# Patient Record
Sex: FEMALE | Race: ASIAN | NOT HISPANIC OR LATINO | Employment: PART TIME | URBAN - METROPOLITAN AREA
[De-identification: names, ages, dates, MRNs, and addresses within clinical notes are randomized per-mention and may not be internally consistent; named-entity substitution may affect disease eponyms.]

---
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
## 2023-05-19 ENCOUNTER — OFFICE VISIT (OUTPATIENT)
Dept: FAMILY MEDICINE | Facility: CLINIC | Age: 18
End: 2023-05-19
Payer: COMMERCIAL

## 2023-05-19 VITALS
DIASTOLIC BLOOD PRESSURE: 71 MMHG | BODY MASS INDEX: 42.41 KG/M2 | WEIGHT: 210 LBS | RESPIRATION RATE: 16 BRPM | SYSTOLIC BLOOD PRESSURE: 104 MMHG | HEART RATE: 68 BPM | TEMPERATURE: 97.8 F | OXYGEN SATURATION: 99 %

## 2023-05-19 DIAGNOSIS — J02.9 SORE THROAT: Primary | ICD-10-CM

## 2023-05-19 LAB
DEPRECATED S PYO AG THROAT QL EIA: NEGATIVE
GROUP A STREP BY PCR: NOT DETECTED

## 2023-05-19 PROCEDURE — 99213 OFFICE O/P EST LOW 20 MIN: CPT | Performed by: PHYSICIAN ASSISTANT

## 2023-05-19 PROCEDURE — 87651 STREP A DNA AMP PROBE: CPT | Performed by: PHYSICIAN ASSISTANT

## 2023-05-19 NOTE — PROGRESS NOTES
Assessment & Plan     Sore throat  Likely viral.   Push fluids, rest and ibuprofen or tylenol for comfort.    RTC for persistent or worsening sx.   PI given and discussed.      - Streptococcus A Rapid Screen w/Reflex to PCR - Clinic Collect  - Group A Streptococcus PCR Throat Swab       Return for Follow up in 1 week if not improved.    Leandra Villegas PA-C  [facility] Love is a 17 year old female who presents to clinic today for the following health issues:  Chief Complaint   Patient presents with     Pharyngitis     X 4 days      HPI  Approximately 1 week hx of ST with associated uri sx.    Slowly getting worse.    Uri sx of rhinorrhea, congestion, cough.    No V/D.   eating and drinking well.   no fevers.  No known exposures.    No known allergies.    Treatment thus far: cough drops, tea temporarily helpful.    Has not done a covid 19 test , prefers to do at home.    Works with the community.    No environmental allergies.      Review of Systems  Constitutional, HEENT, cardiovascular, pulmonary, gi and gu systems are negative, except as otherwise noted.      Objective    /71 (BP Location: Right arm, Patient Position: Sitting, Cuff Size: Adult Large)   Pulse 68   Temp 97.8  F (36.6  C) (Tympanic)   Resp 16   Wt 95.3 kg (210 lb)   LMP 04/18/2023   SpO2 99%   BMI 42.41 kg/m    Physical Exam   Pt is in no acute distress and appears well  Ears patent B:  TM s intact, non-injected. All land marks easily visibile    Nasal mucosa is non-edematous, no discharge.    Pharynx: non erythematous, tonsils non hypertrophied, No exudate   Neck supple: no adenopathy  Lungs: CTA  Heart: RRR, no murmur, no thrills or heaves   Ext: no edema  Skin: no rashes    Results for orders placed or performed in visit on 05/19/23   Streptococcus A Rapid Screen w/Reflex to PCR - Clinic Collect     Status: Normal    Specimen: Throat; Swab   Result Value Ref Range    Group A Strep  antigen Negative Negative

## 2023-05-19 NOTE — PATIENT INSTRUCTIONS
Recommend considering a home covid 19 test.  It will not change your treatment plan given duration of your symptoms but would be helpful for limiting exposures and knowledge of the cause of your symptoms.      Continue conservative measures : warm tea, honey, losenges.    Could try claritin or zyrtec over the counter

## 2023-06-22 NOTE — PROGRESS NOTES
Subjective:      Kate Ny is a 12 y.o. female here with sore throat x 1 week. Pain with swallowing, sore throat seems to come and go, appetite decreased but drinking well, has been nauseated, no vomiting or diarrhea, no stomach ache. She has had some sneezing, runny nose and little bit of nasal congestion, no cough, cold symptoms have been waxing and waning for the past 1 week as well. No OTC taken. No fevers, patient afebrile in clinic.  No other ill contacts at home.      Objective:     Vitals:    12/18/17 1816   BP: 92/58   Pulse: 79   Resp: 16   Temp: 98.4  F (36.9  C)   SpO2: 98%       General: Alert, interactive, hoarse voice, NAD, cooperative on exam  Eyes: PERRLA, EOMI, conjunctivae clear.   Ears: Right TM; pink and translucent. Left TM; pink and translucent   Nose:  Nasal mucosa erythema and inflammation. Clear mucus .  No maxillary or frontal sinus tenderness  Mouth/Throat:  Tonsillar hypertrophy, 2+, erythematous, no exudate. Uvula midline with palatal petechiae. Posterior pharynx erythematous.  Mucus membranes pink and moist, free of lesions.  Neck: Supple, symmetrical, trachea midline, no adenopathy   Lungs: CTA bilaterally, good air movement throughout. No rales, rhonchi or wheezing  Heart:: Regular rate and rhythm, S1, S2 normal, no murmur, click, rub or gallop  ABD: Soft, flat, mild periumbilical tenderness, no guarding or rebound pain, nondistended, No HSM or masses. +BS      Results for orders placed or performed in visit on 12/18/17   Rapid Strep A Screen-Throat   Result Value Ref Range    Rapid Strep A Antigen Group A Strep detected (!) No Group A Strep detected, presumptive negative            Assessment/Plan:      1. Strep throat  - amoxicillin (AMOXIL) 875 MG tablet; Take 1 tablet (875 mg total) by mouth 2 (two) times a day for 10 days. Take with food.  Dispense: 20 tablet; Refill: 0    2. Throat pain  - Rapid Strep A Screen-Throat     I reviewed exam and lab findings with patient and  mom. Discussed antibiotics for strep throat with parent. Advised ibuprofen or acetaminophen prn for discomfort/fever, proper dosing discussed.  Contagious precautions reviewed. Recommended plenty of fluids and rest. Follow-up with primary for persistence or worsening of symptoms. Mom verbalized understanding and agrees with plan of care.     -Patient instructions given  -Note for school provided                    Sun Osuna(Attending)

## 2024-08-17 ENCOUNTER — OFFICE VISIT (OUTPATIENT)
Dept: FAMILY MEDICINE | Facility: CLINIC | Age: 19
End: 2024-08-17
Payer: COMMERCIAL

## 2024-08-17 VITALS
TEMPERATURE: 98.2 F | RESPIRATION RATE: 12 BRPM | DIASTOLIC BLOOD PRESSURE: 69 MMHG | HEART RATE: 88 BPM | SYSTOLIC BLOOD PRESSURE: 103 MMHG | OXYGEN SATURATION: 95 %

## 2024-08-17 DIAGNOSIS — S05.01XA ABRASION OF RIGHT CORNEA, INITIAL ENCOUNTER: Primary | ICD-10-CM

## 2024-08-17 PROCEDURE — 99213 OFFICE O/P EST LOW 20 MIN: CPT

## 2024-08-17 RX ORDER — ERYTHROMYCIN 5 MG/G
OINTMENT OPHTHALMIC
Qty: 3.5 G | Refills: 0 | Status: SHIPPED | OUTPATIENT
Start: 2024-08-17 | End: 2024-08-17

## 2024-08-17 RX ORDER — ERYTHROMYCIN 5 MG/G
OINTMENT OPHTHALMIC
Qty: 3.5 G | Refills: 0 | Status: SHIPPED | OUTPATIENT
Start: 2024-08-17

## 2024-08-17 NOTE — PROGRESS NOTES
Assessment & Plan       ICD-10-CM    1. Abrasion of right cornea, initial encounter  S05.01XA erythromycin (ROMYCIN) 5 MG/GM ophthalmic ointment     DISCONTINUED: erythromycin (ROMYCIN) 5 MG/GM ophthalmic ointment         Corneal abrasion confirmed with fluoroscein/woods lamp exam. Medial iris but on the edge nearest the pupil. Doesn't appear penetrating or large. No vision changes or anything to suggest penetrating injury or retained material. Will treat with erythromycin.     Follow up with primary care provider with any problems, questions or concerns or if symptoms worsen or fail to improve. Patient agreed to plan and verbalized understanding.     Subjective     Kate is a 19 year old female who presents to clinic today for the following health issues:  Chief Complaint   Patient presents with    Eye Problem     Hit I right eye. Incident happened around 2:00 pm today.    Conjunctivitis     HPI    Was at work today and at 2 pm a clothes  snapped right in her hands and a small piece flew and hit her eye. Still hurts but no vision changes, no bleeding, minimal lacrimation.     Review of Systems    10 point ROS performed and negative except as noted in HPI.     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    Social History     Tobacco Use    Smoking status: Never    Smokeless tobacco: Never   Substance Use Topics    Alcohol use: No           Objective    /69   Pulse 88   Temp 98.2  F (36.8  C) (Tympanic)   Resp 12   SpO2 95%   Physical Exam   Constitutional:       General: Patient is not in acute distress.     Appearance: Normal appearance.   HENT:      Head: Normocephalic and atraumatic.      Right Ear: External ear normal.      Left Ear: External ear normal.      Nose: No congestion, rhinorrhea.      Mouth/Throat:      Mouth: Mucous membranes are moist.      Pharynx:  Oropharynx is clear, No exudate.   Eyes:      General: No scleral icterus.     Extraocular Movements: Extraocular movements intact.      Conjunctiva/sclera: Conjunctivae normal.      Pupils: Pupils are equal, round, and reactive to light.   R eye with small punctate abrasion over medial iris just at the edge of the pupil (examined under woods lamp w/ fluoroscein)  Pulmonary:      Effort: Pulmonary effort is normal.   Cardiovascular:      Regular heart rate  Abdominal:      General: Abdomen is flat.   Musculoskeletal:         General: No swelling or deformity. Normal range of motion.      Cervical back: Normal range of motion and neck supple.   Skin:     General: Skin is warm and dry.      Coloration: Skin is not jaundiced.      Findings: No bruising, lesion or rash.   Neurological:      General: No focal deficit present.      Mental Status: Patient is alert. Mental status is at baseline.   Psychiatric:         Mood and Affect: Mood normal.         Behavior: Behavior normal.         Thought Content: Thought content normal.       Jolene Ortega MD

## 2025-01-10 ENCOUNTER — TELEPHONE (OUTPATIENT)
Dept: FAMILY MEDICINE | Facility: CLINIC | Age: 20
End: 2025-01-10
Payer: COMMERCIAL

## 2025-03-22 ENCOUNTER — OFFICE VISIT (OUTPATIENT)
Dept: URGENT CARE | Facility: URGENT CARE | Age: 20
End: 2025-03-22
Payer: COMMERCIAL

## 2025-03-22 VITALS
BODY MASS INDEX: 45.48 KG/M2 | HEART RATE: 92 BPM | RESPIRATION RATE: 16 BRPM | OXYGEN SATURATION: 98 % | TEMPERATURE: 97.1 F | SYSTOLIC BLOOD PRESSURE: 115 MMHG | DIASTOLIC BLOOD PRESSURE: 77 MMHG | WEIGHT: 225 LBS

## 2025-03-22 DIAGNOSIS — J02.9 PHARYNGITIS, UNSPECIFIED ETIOLOGY: ICD-10-CM

## 2025-03-22 DIAGNOSIS — R07.0 THROAT PAIN: Primary | ICD-10-CM

## 2025-03-22 LAB
DEPRECATED S PYO AG THROAT QL EIA: NEGATIVE
S PYO DNA THROAT QL NAA+PROBE: NOT DETECTED

## 2025-03-22 PROCEDURE — 87651 STREP A DNA AMP PROBE: CPT | Performed by: PHYSICIAN ASSISTANT

## 2025-03-22 PROCEDURE — 3074F SYST BP LT 130 MM HG: CPT | Performed by: PHYSICIAN ASSISTANT

## 2025-03-22 PROCEDURE — 3078F DIAST BP <80 MM HG: CPT | Performed by: PHYSICIAN ASSISTANT

## 2025-03-22 PROCEDURE — 99213 OFFICE O/P EST LOW 20 MIN: CPT | Performed by: PHYSICIAN ASSISTANT

## 2025-03-22 RX ORDER — LEVONORGESTREL AND ETHINYL ESTRADIOL 0.15-0.03
1 KIT ORAL
COMMUNITY
Start: 2024-09-24

## 2025-03-22 RX ORDER — AMOXICILLIN 500 MG/1
500 CAPSULE ORAL 2 TIMES DAILY
Qty: 20 CAPSULE | Refills: 0 | Status: SHIPPED | OUTPATIENT
Start: 2025-03-22 | End: 2025-04-01

## 2025-03-22 NOTE — PROGRESS NOTES
Assessment & Plan       ICD-10-CM    1. Throat pain  R07.0 Streptococcus A Rapid Screen w/Reflex to PCR - Clinic Collect     Group A Streptococcus PCR Throat Swab      2. Pharyngitis, unspecified etiology  J02.9 amoxicillin (AMOXIL) 500 MG capsule        Symptom duration >10 days and getting worse  Negative strep but other possibile etiologies and with duration of symptoms and worsening, will treat  Follow up if not improving      No follow-ups on file.    Subjective   Kate is a 19 year old, presenting for the following health issues:  Urgent Care (Sore throat x 4 days )    HPI      STarted over a week ago ([facility])  FElt warm Sunday through Monday but did not take her temp  No sinus pressure or congestion  Does not get sick that often  No h/o allergies        Review of Systems  Remainder of ROS obtained and found to be negative other than that which was documented above        Objective    /77 (BP Location: Right arm, Patient Position: Sitting, Cuff Size: Adult Large)   Pulse 92   Temp 97.1  F (36.2  C) (Tympanic)   Resp 16   Wt 102.1 kg (225 lb)   SpO2 98%   Breastfeeding No   BMI 45.48 kg/m    Body mass index is 45.48 kg/m .  Physical Exam   GENERAL: alert and no distress  EYES: Eyes grossly normal to inspection  HENT: ear canals and TM's normal, nose and mouth without ulcers or lesions  Throat red  RESP: lungs clear to auscultation - no rales, rhonchi or wheezes  CV: regular rates and rhythm    Diagnostic Tests:   Rapid strep: negative        Signed Electronically by: Yessica Birmingham PA-C